# Patient Record
Sex: FEMALE | Race: WHITE | NOT HISPANIC OR LATINO | Employment: FULL TIME | URBAN - METROPOLITAN AREA
[De-identification: names, ages, dates, MRNs, and addresses within clinical notes are randomized per-mention and may not be internally consistent; named-entity substitution may affect disease eponyms.]

---

## 2018-04-17 ENCOUNTER — OFFICE VISIT (OUTPATIENT)
Dept: MEDICAL GROUP | Facility: MEDICAL CENTER | Age: 23
End: 2018-04-17
Payer: COMMERCIAL

## 2018-04-17 VITALS
BODY MASS INDEX: 19.48 KG/M2 | SYSTOLIC BLOOD PRESSURE: 104 MMHG | WEIGHT: 99.2 LBS | TEMPERATURE: 98.4 F | HEART RATE: 80 BPM | RESPIRATION RATE: 12 BRPM | HEIGHT: 60 IN | OXYGEN SATURATION: 99 % | DIASTOLIC BLOOD PRESSURE: 74 MMHG

## 2018-04-17 DIAGNOSIS — Z00.00 WELL ADULT EXAM: ICD-10-CM

## 2018-04-17 PROCEDURE — 99385 PREV VISIT NEW AGE 18-39: CPT | Performed by: NURSE PRACTITIONER

## 2018-04-17 ASSESSMENT — PATIENT HEALTH QUESTIONNAIRE - PHQ9: CLINICAL INTERPRETATION OF PHQ2 SCORE: 0

## 2018-04-17 NOTE — LETTER
[facility]  SHALOM MarieRCRISTINE.  [address]  [place] [address]  Fax: [phone]   Authorization for Release/Disclosure of   Protected Health Information   Name: VIKKI TODD : 1995 SSN: [id number]   Address: [address] [place] [address] [facility] [address]  [place] [address] Phone:    [phone] (home)    I authorize the entity listed below to release/disclose the PHI below to:   [facility]/ARISTEO MarieP.RBREANNA and IRINA Marie   Provider or Entity Name:  Dr. Taurus Santoro   Address   City, State, Zip   Phone:  [phone]    Fax:     Reason for request: continuity of care   Information to be released:    [  ] LAST COLONOSCOPY,  including any PATH REPORT and follow-up  [  ] LAST FIT/COLOGUARD RESULT [  ] LAST DEXA  [  ] LAST MAMMOGRAM  [  ] LAST PAP  [  ] LAST LABS [  ] RETINA EXAM REPORT  [  ] IMMUNIZATION RECORDS  [  x] Release all info      [  ] Check here and initial the line next to each item to release ALL health information INCLUDING  _____ Care and treatment for drug and / or alcohol abuse  _____ HIV testing, infection status, or AIDS  _____ Genetic Testing    DATES OF SERVICE OR TIME PERIOD TO BE DISCLOSED: _____________  I understand and acknowledge that:  * This Authorization may be revoked at any time by you in writing, except if your health information has already been used or disclosed.  * Your health information that will be used or disclosed as a result of you signing this authorization could be re-disclosed by the recipient. If this occurs, your re-disclosed health information may no longer be protected by State or Federal laws.  * You may refuse to sign this Authorization. Your refusal will not affect your ability to obtain treatment.  * This Authorization becomes effective upon signing and will  on (date) __________.      If no date is indicated, this Authorization will  one (1) year from the signature date.    Name: Vikki  Candelaria    Signature:   Date:     4/17/2018       PLEASE FAX REQUESTED RECORDS BACK TO: [phone]

## 2018-04-18 NOTE — PROGRESS NOTES
CC: establish care, well exam      Vikki Gaona is a 23 y.o. female here to establish care and to discuss the evaluation and management of:    1. Well adult exam  Vikki Gaona is a 23 year old female here to establish care. Moved about one year ago from [place]. She reports no current medical history, no surgical history. Family history includes her paternal grandmother with ovarian cancer and osteoporosis and her paternal grandfather with liver disease. She does not use tobacco products or illicit drugs and occasionally has alcohol. She does not take any medication on a daily basis. She does try to exercise. She reports she has never had a pap smear. Denies any concerns at this time.       ROS:  Denies any Headache, Blurred Vision, Confusion Chest pain,  Shortness of breath,  Abdominal pain, Changes of bowel or bladder, Lower ext edema, Fevers, Nights sweats, Weight Changes, Focal weakness or numbness.  All other systems are negative.    No current outpatient prescriptions on file.    No Known Allergies    History reviewed. No pertinent past medical history.  History reviewed. No pertinent surgical history.  Family History   Problem Relation Age of Onset   • Cancer Paternal Grandmother      ovarian   • Osteoporosis Paternal Grandmother    • Other Paternal Grandfather      liver     Social History     Social History   • Marital status:      Spouse name: N/A   • Number of children: N/A   • Years of education: N/A     Occupational History   • Not on file.     Social History Main Topics   • Smoking status: Never Smoker   • Smokeless tobacco: Never Used   • Alcohol use Yes      Comment: occ   • Drug use: No   • Sexual activity: Yes     Birth control/ protection: Condom     Other Topics Concern   • Not on file     Social History Narrative   • No narrative on file       Objective:     Vitals: /74   Pulse 80   Temp 36.9 °C (98.4 °F)   Resp 12   Ht 1.524 m (5')   Wt 45 kg (99 lb 3.2 oz)   LMP  03/19/2018   SpO2 99%   BMI 19.37 kg/m²      General: Alert, pleasant, NAD  HEENT:  Normocephalic.  PERRL, EOMI, no icterus or pallor.  Conjunctivae and lids normal.  External ears normal. Tympanic membranes pearly, opaque.  Nares patent, mucosa pink.  Oropharynx non-erythematous, mucous membranes moist.  Neck supple.  No thyromegaly or masses palpated. No cervical or supraclavicular lymphadenopathy.  Heart:  Regular rate and rhythm.  S1 and S2 normal.  No murmurs appreciated   Respiratory:  Normal respiratory effort.  Clear to auscultation bilaterally.    Abdomen:  Bowel sounds present.  Non-distended, soft,   Skin:  Warm, dry, no rashes  Musculoskeletal:  Gait is normal.  Moves all extremities well.  Extremities:   No leg edema.  Neurological: No tremors, sensation grossly intact, patellar reflexes 2+ symmetric, tone/strength normal, gait is normal, no clonus, rapid movements normal, finger-to-nose intact, CN2-12 intact  Psych:  Affect/mood is normal, judgement is good, memory is intact, grooming is appropriate.      Assessment and Plan.   23 y.o. female to establish care     1. Well adult exam  Vikki is a pleasant healthy 23 year old female. Advised her to schedule a pap smear at her earliest convenience. Continue to exercise, avoid tobacco products, limit alcohol use and have a heart healthy diet rich in fruits and vegetables and whole grains.Follow up yearly.      Health Maintenance: Advised pap smear. Requesting records from previous provider in [place].    Return for Annual Preventative Exam.          Yaima AREVALO

## 2018-04-25 ENCOUNTER — APPOINTMENT (OUTPATIENT)
Dept: RADIOLOGY | Facility: IMAGING CENTER | Age: 23
End: 2018-04-25
Attending: NURSE PRACTITIONER
Payer: COMMERCIAL

## 2018-04-25 ENCOUNTER — OFFICE VISIT (OUTPATIENT)
Dept: URGENT CARE | Facility: PHYSICIAN GROUP | Age: 23
End: 2018-04-25
Payer: COMMERCIAL

## 2018-04-25 VITALS
DIASTOLIC BLOOD PRESSURE: 80 MMHG | WEIGHT: 99 LBS | TEMPERATURE: 98.2 F | HEART RATE: 81 BPM | BODY MASS INDEX: 19.44 KG/M2 | HEIGHT: 60 IN | OXYGEN SATURATION: 99 % | SYSTOLIC BLOOD PRESSURE: 122 MMHG

## 2018-04-25 DIAGNOSIS — M79.642 LEFT HAND PAIN: Primary | ICD-10-CM

## 2018-04-25 DIAGNOSIS — M79.642 LEFT HAND PAIN: ICD-10-CM

## 2018-04-25 PROCEDURE — 99214 OFFICE O/P EST MOD 30 MIN: CPT | Performed by: NURSE PRACTITIONER

## 2018-04-25 PROCEDURE — 73130 X-RAY EXAM OF HAND: CPT | Mod: TC,LT | Performed by: NURSE PRACTITIONER

## 2018-04-25 ASSESSMENT — ENCOUNTER SYMPTOMS
FEVER: 0
MYALGIAS: 0
CHILLS: 0
TINGLING: 0
SENSORY CHANGE: 0
FOCAL WEAKNESS: 0

## 2018-04-25 ASSESSMENT — PAIN SCALES - GENERAL: PAINLEVEL: 4=SLIGHT-MODERATE PAIN

## 2018-04-26 NOTE — PROGRESS NOTES
Subjective:      Vikki Gaona is a 23 y.o. female who presents with Hand Problem (Pt complains of Lt pollox pain. Pain on opposition that radiates into wrist. Slight pain when bending 1st finger. )            Medications, Allergies and Prior Medical Hx reviewed and updated in [facility].with patient/family today     Patient states she woke up with left hand/wrist pain. She denies any trauma or precipitating incident. 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      Hand Injury   This is a new problem. The current episode started today. The problem occurs constantly. The problem has been unchanged. Pertinent negatives include no chills, fever, myalgias or rash. The symptoms are aggravated by bending and twisting. She has tried nothing for the symptoms. The treatment provided no relief.       Review of Systems   Constitutional: Negative for chills and fever.   Musculoskeletal: Positive for joint pain. Negative for myalgias.   Skin: Negative for rash.   Neurological: Negative for tingling, sensory change and focal weakness.          Objective:     /80   Pulse 81   Temp 36.8 °C (98.2 °F)   Ht 1.524 m (5')   Wt 44.9 kg (99 lb)   LMP 04/25/2018   SpO2 99%   Breastfeeding? No   BMI 19.33 kg/m²      Physical Exam   Constitutional: She appears well-developed and well-nourished. No distress.   HENT:   Head: Normocephalic and atraumatic.   Eyes: Conjunctivae are normal. Pupils are equal, round, and reactive to light.   Neck: Neck supple.   Cardiovascular: Normal rate.    Pulmonary/Chest: Effort normal. No respiratory distress.   Musculoskeletal:        Left hand: She exhibits decreased range of motion and tenderness. She exhibits no bony tenderness, normal capillary refill, no deformity and no swelling. Normal sensation noted.        Hands:  No erythema, no ecchymosis.   Neurological: She is alert.   Awake, alert, answering questions appropriately, moving all extremeties   Skin: Skin is warm and dry. Capillary refill takes  less than 2 seconds. No erythema.   Psychiatric: She has a normal mood and affect. Her behavior is normal.   Vitals reviewed.              Assessment/Plan:     1. Left hand pain  DX-HAND 3+ LEFT       Xray of left hand -  Reviewed film and radiology interpretation:     No evidence of acute fracture or dislocation.  Mild soft tissue swelling seen about the first and second digits.      Rest, Ice, Elevation, Ibuprofen, velcro thumb spica  Pt will go to the ER for worsening or changing symptoms as discussed,  Follow-up with your primary care provider or return here if not improving in 7 days   Discharge instructions discussed with pt/family who verbalize understanding and agreement with poc

## 2019-05-03 ENCOUNTER — OFFICE VISIT (OUTPATIENT)
Dept: MEDICAL GROUP | Facility: MEDICAL CENTER | Age: 24
End: 2019-05-03
Payer: COMMERCIAL

## 2019-05-03 VITALS
OXYGEN SATURATION: 100 % | HEART RATE: 96 BPM | SYSTOLIC BLOOD PRESSURE: 100 MMHG | HEIGHT: 60 IN | WEIGHT: 103 LBS | BODY MASS INDEX: 20.22 KG/M2 | RESPIRATION RATE: 16 BRPM | DIASTOLIC BLOOD PRESSURE: 62 MMHG | TEMPERATURE: 98.8 F

## 2019-05-03 DIAGNOSIS — Z00.00 WELL ADULT EXAM: ICD-10-CM

## 2019-05-03 DIAGNOSIS — G44.211 INTRACTABLE EPISODIC TENSION-TYPE HEADACHE: ICD-10-CM

## 2019-05-03 PROCEDURE — 99395 PREV VISIT EST AGE 18-39: CPT | Performed by: NURSE PRACTITIONER

## 2019-05-03 ASSESSMENT — PATIENT HEALTH QUESTIONNAIRE - PHQ9: CLINICAL INTERPRETATION OF PHQ2 SCORE: 0

## 2019-05-03 NOTE — PROGRESS NOTES
cc:  Well adult exam.      Subjective:     HPI:     Vikki Gaona is a 24 y.o. female here to discuss the evaluation and management of:    1. Well adult exam  Have discussed recommended immunizations and screenings.  Patient has not received a Pap smear as of yet.  We have discussed the importance of routine screening for cervical cancer.    2. Intractable episodic tension-type headache  Chronic.  Mostly controlled with Tylenol or ibuprofen.  Does not affect her daily functions.    ROS:  Denies any Headache, Blurred Vision, Confusion, Chest pain,  Shortness of breath,  Abdominal pain, Changes of bowel or bladder, Lower ext edema, Fevers, Nights sweats, Weight Changes, Focal weakness or numbness.  And all other systems are negative.      No current outpatient prescriptions on file.    No Known Allergies    History reviewed. No pertinent past medical history.  History reviewed. No pertinent surgical history.  Family History   Problem Relation Age of Onset   • Cancer Paternal Grandmother         ovarian   • Osteoporosis Paternal Grandmother    • Other Paternal Grandfather         liver     Social History     Social History   • Marital status:      Spouse name: N/A   • Number of children: N/A   • Years of education: N/A     Occupational History   • Not on file.     Social History Main Topics   • Smoking status: Never Smoker   • Smokeless tobacco: Never Used   • Alcohol use Yes      Comment: occ   • Drug use: No   • Sexual activity: Yes     Partners: Male     Birth control/ protection: Condom     Other Topics Concern   • Not on file     Social History Narrative   • No narrative on file       Objective:     Vitals: /62   Pulse 96   Temp 37.1 °C (98.8 °F)   Resp 16   Ht 1.524 m (5')   Wt 46.7 kg (103 lb)   SpO2 100%   BMI 20.12 kg/m²    General: Alert, pleasant, NAD  HEENT:  Normocephalic.  PERRL, EOMI, no icterus or pallor.  Conjunctivae and lids normal.  External ears normal. Tympanic membranes  pearly, opaque.  Nares patent, mucosa pink.  Oropharynx non-erythematous, mucous membranes moist.  Neck supple.  No thyromegaly or masses palpated. No cervical or supraclavicular lymphadenopathy.  Heart:  Regular rate and rhythm.  S1 and S2 normal.  No murmurs appreciated.    Respiratory:  Normal respiratory effort.  Clear to auscultation bilaterally.    Abdomen:  Bowel sounds present.  Non-distended, soft, non-tender, no guarding/rebound. No hepatosplenomegaly.  No hernias.  Skin:  Warm, dry, no rashes  Musculoskeletal:  Gait is normal.  Moves all extremities well.  Extremities: No leg edema.  Neurological: No tremors, sensation grossly intact, tone and strength normal, CN2-12 intact  Psych:  Affect/mood is normal, judgement is good, memory is intact, grooming is appropriate.      Assessment/Plan:     Vikki was seen today for annual exam.    Diagnoses and all orders for this visit:    Well adult exam  Relatively healthy adult 24-year-old female.  Have discussed recommended screenings immunizations.  Have discussed importance of routine screening for cervical cancer.  Have encouraged patient to obtain a Pap smear.  Continue to have a heart healthy diet, limit alcohol use.  Continue to avoid tobacco use.    Intractable episodic tension-type headache  -     Comp Metabolic Panel; Future  -     TSH WITH REFLEX TO FT4; Future  -     CBC WITHOUT DIFFERENTIAL; Future        Return for Annual Preventative Exam.          Yaima AREVALO

## 2019-06-08 LAB
ALBUMIN SERPL-MCNC: 4.9 G/DL (ref 3.5–5.5)
ALBUMIN/GLOB SERPL: 2.5 {RATIO} (ref 1.2–2.2)
ALP SERPL-CCNC: 49 IU/L (ref 39–117)
ALT SERPL-CCNC: 19 IU/L (ref 0–32)
AST SERPL-CCNC: 22 IU/L (ref 0–40)
BASOPHILS # BLD AUTO: 0 X10E3/UL (ref 0–0.2)
BASOPHILS NFR BLD AUTO: 0 %
BILIRUB SERPL-MCNC: 0.8 MG/DL (ref 0–1.2)
BUN SERPL-MCNC: 10 MG/DL (ref 6–20)
BUN/CREAT SERPL: 14 (ref 9–23)
CALCIUM SERPL-MCNC: 9.4 MG/DL (ref 8.7–10.2)
CHLORIDE SERPL-SCNC: 107 MMOL/L (ref 96–106)
CO2 SERPL-SCNC: 20 MMOL/L (ref 20–29)
CREAT SERPL-MCNC: 0.7 MG/DL (ref 0.57–1)
EOSINOPHIL # BLD AUTO: 0.1 X10E3/UL (ref 0–0.4)
EOSINOPHIL NFR BLD AUTO: 2 %
ERYTHROCYTE [DISTWIDTH] IN BLOOD BY AUTOMATED COUNT: 13.9 % (ref 12.3–15.4)
GLOBULIN SER CALC-MCNC: 2 G/DL (ref 1.5–4.5)
GLUCOSE SERPL-MCNC: 94 MG/DL (ref 65–99)
HCT VFR BLD AUTO: 42.3 % (ref 34–46.6)
HGB BLD-MCNC: 14.2 G/DL (ref 11.1–15.9)
IMM GRANULOCYTES # BLD AUTO: 0 X10E3/UL (ref 0–0.1)
IMM GRANULOCYTES NFR BLD AUTO: 0 %
IMMATURE CELLS  115398: NORMAL
LYMPHOCYTES # BLD AUTO: 1.8 X10E3/UL (ref 0.7–3.1)
LYMPHOCYTES NFR BLD AUTO: 37 %
MCH RBC QN AUTO: 30.5 PG (ref 26.6–33)
MCHC RBC AUTO-ENTMCNC: 33.6 G/DL (ref 31.5–35.7)
MCV RBC AUTO: 91 FL (ref 79–97)
MONOCYTES # BLD AUTO: 0.5 X10E3/UL (ref 0.1–0.9)
MONOCYTES NFR BLD AUTO: 10 %
MORPHOLOGY BLD-IMP: NORMAL
NEUTROPHILS # BLD AUTO: 2.5 X10E3/UL (ref 1.4–7)
NEUTROPHILS NFR BLD AUTO: 51 %
NRBC BLD AUTO-RTO: NORMAL %
PLATELET # BLD AUTO: 225 X10E3/UL (ref 150–450)
POTASSIUM SERPL-SCNC: 4.4 MMOL/L (ref 3.5–5.2)
PROT SERPL-MCNC: 6.9 G/DL (ref 6–8.5)
RBC # BLD AUTO: 4.66 X10E6/UL (ref 3.77–5.28)
SODIUM SERPL-SCNC: 140 MMOL/L (ref 134–144)
TSH SERPL DL<=0.005 MIU/L-ACNC: 2.77 UIU/ML (ref 0.45–4.5)
WBC # BLD AUTO: 4.8 X10E3/UL (ref 3.4–10.8)

## 2019-07-16 ENCOUNTER — HOSPITAL ENCOUNTER (OUTPATIENT)
Facility: MEDICAL CENTER | Age: 24
End: 2019-07-16
Attending: NURSE PRACTITIONER
Payer: COMMERCIAL

## 2019-07-16 ENCOUNTER — OFFICE VISIT (OUTPATIENT)
Dept: MEDICAL GROUP | Facility: MEDICAL CENTER | Age: 24
End: 2019-07-16
Payer: COMMERCIAL

## 2019-07-16 VITALS
RESPIRATION RATE: 16 BRPM | HEIGHT: 60 IN | SYSTOLIC BLOOD PRESSURE: 108 MMHG | OXYGEN SATURATION: 100 % | BODY MASS INDEX: 20.03 KG/M2 | DIASTOLIC BLOOD PRESSURE: 70 MMHG | HEART RATE: 85 BPM | WEIGHT: 102 LBS | TEMPERATURE: 98.6 F

## 2019-07-16 DIAGNOSIS — Z12.4 PAP SMEAR FOR CERVICAL CANCER SCREENING: ICD-10-CM

## 2019-07-16 PROCEDURE — 99395 PREV VISIT EST AGE 18-39: CPT | Performed by: NURSE PRACTITIONER

## 2019-07-16 PROCEDURE — 88142 CYTOPATH C/V THIN LAYER: CPT

## 2019-07-16 NOTE — PROGRESS NOTES
CC:  Pap/Well Woman Exam    History of present illness:    Vikki Gaona is 24 y.o. female presenting today for well woman exam with gynecological exam and Pap smear.  No history of Pap smear.   She reports her periods are regular.  She is currently using  condoms as birth control. Never been pregnant. She is currently  exercising on a routine basis.     Last mammogram: N/A  Last Dexa scan: N/A  Last colonoscopy: N/A    No past medical history on file.    History reviewed. No pertinent surgical history.    Outpatient Encounter Prescriptions as of 7/16/2019   Medication Sig Dispense Refill   • Cholecalciferol (VITAMIN D PO) Take  by mouth.     • Multiple Vitamins-Minerals (ZINC PO) Take  by mouth.     • Magnesium Chloride (MAGNESIUM DR PO) Take  by mouth.     • Multiple Vitamin (MULTI-DAY PO) Take  by mouth.       No facility-administered encounter medications on file as of 7/16/2019.        Patient Active Problem List    Diagnosis Date Noted   • Intractable episodic tension-type headache 05/03/2019       .  Social History     Social History   • Marital status:      Spouse name: N/A   • Number of children: N/A   • Years of education: N/A     Occupational History   • Not on file.     Social History Main Topics   • Smoking status: Never Smoker   • Smokeless tobacco: Never Used   • Alcohol use Yes      Comment: occ   • Drug use: No   • Sexual activity: Yes     Partners: Male     Birth control/ protection: Condom     Other Topics Concern   • Not on file     Social History Narrative   • No narrative on file       Family History   Problem Relation Age of Onset   • Cancer Paternal Grandmother         ovarian   • Osteoporosis Paternal Grandmother    • Other Paternal Grandfather         liver         ROS: Denies Weight loss, fatigue, chest pain, SOB, bowel or bladder changes.  Denies musculoskeletal, neurological, or psychiatric problems.  Denies dysuria, dyspareunia or abnormal vaginal discharge.  States in a safe  relationship yes      /70   Pulse 85   Temp 37 °C (98.6 °F)   Resp 16   Ht 1.524 m (5')   Wt 46.3 kg (102 lb)   SpO2 100%   BMI 19.92 kg/m²     GEN:  Appears well and in no apparent distress   NECK:  Supple without adenopathy or thyromegaly  LUNGS:  Clear to auscultation.  Normal breath sounds.  CV:  RRR without murmers or S3 or S4.  Peripheral pulses intact.  BREAST:  Symmetrical without masses or axillary adenopathy.  ABD:  Soft, non-tender, non-distended, normal bowel sounds.  No hepatosplenomegaly.  :  Normal external female genitalia.  Vaginal canal clear.  Cervix appears normal.  Bimanual exam:  No CMT, normal size uterus without masses or tenderness; no adnexal masses or tenderness.      Assessment and plan      1. Pap smear for cervical cancer screening    -Thin prep Pap, reflex HPV on ASCUS and above    Other orders  - Cholecalciferol (VITAMIN D PO); Take  by mouth.  - Multiple Vitamins-Minerals (ZINC PO); Take  by mouth.  - Magnesium Chloride (MAGNESIUM DR PO); Take  by mouth.  - Multiple Vitamin (MULTI-DAY PO); Take  by mouth.        Have encouraged the patient to have a heart healthy diet, rich in fruits and vegetables. Limit alcohol use, avoid tobacco products. Participate in physical activity at least 3-5 times weekly. Take a multivitamin daily. Continue with recommended screenings and immunizations.     A chaperone was offered to the patient during today's exam. Chaperone name: Bianca  was present.    I have placed the below orders and discussed them with an approved delegating provider.  The MA is performing the below orders under the direction of Dr. Harrington.      Pap Smear  Specimen collected today will await results from the lab.

## 2019-07-26 LAB — TEST NAME 95000: NORMAL
